# Patient Record
Sex: MALE | Race: OTHER | Employment: OTHER | URBAN - METROPOLITAN AREA
[De-identification: names, ages, dates, MRNs, and addresses within clinical notes are randomized per-mention and may not be internally consistent; named-entity substitution may affect disease eponyms.]

---

## 2022-02-01 NOTE — PATIENT DISCUSSION
Patient educated to discontinue contact lens wear a minimum of 4 days prior to repeat SOs. If patient reinserts after SOs, will need to DC 1 day prior to surgery.

## 2022-02-15 NOTE — PATIENT DISCUSSION
Cataract surgery has been performed in the first eye and activities of daily living are still impaired. The patient would like to proceed with cataract surgery in the second eye as scheduled. The patient elects B+ OD, goal of emmetropia.

## 2022-02-21 NOTE — PATIENT DISCUSSION
.  Chief Complaint   Patient presents with   • Trauma Red     Transfer from [facility].  Patient found with AMS, facial trauma loss of tooth, lip laceration 3 cm with fix at prior facility. +ETOH, aspiration of blood leading to aspiration pneumonia.  Intubated PTA to protect airway with propofol infusing.  NG/OG/Herring placed PTA.     Patient has a history of monovision with CL, did not appreciate.

## 2022-08-12 NOTE — PATIENT DISCUSSION
8/12/22: VF today more unreliable. Pt still complaining of loss of peripheral vision OS on his lower left side.

## 2023-04-19 ENCOUNTER — SURGERY/PROCEDURE (OUTPATIENT)
Dept: URBAN - METROPOLITAN AREA CLINIC 39 | Facility: CLINIC | Age: 78
End: 2023-04-19

## 2023-04-19 ENCOUNTER — PRE-OP/H&P (OUTPATIENT)
Dept: URBAN - METROPOLITAN AREA SURGERY 14 | Facility: SURGERY | Age: 78
End: 2023-04-19

## 2023-04-19 DIAGNOSIS — H25.813: ICD-10-CM

## 2023-04-19 PROCEDURE — 99211T TECH SERVICE

## 2023-04-19 PROCEDURE — 66999LNSR LENSAR LASER FOR CAT SX

## 2023-04-19 PROCEDURE — 66984CV REMOVE CATARACT, INSERT LENS, CUSTOM VISION

## 2023-04-20 ENCOUNTER — POST-OP (OUTPATIENT)
Dept: URBAN - METROPOLITAN AREA CLINIC 39 | Facility: CLINIC | Age: 78
End: 2023-04-20

## 2023-04-20 DIAGNOSIS — Z96.1: ICD-10-CM

## 2023-04-20 PROCEDURE — 99024 POSTOP FOLLOW-UP VISIT: CPT

## 2023-04-20 ASSESSMENT — TONOMETRY
OD_IOP_MMHG: 18
OS_IOP_MMHG: 16

## 2023-04-20 ASSESSMENT — VISUAL ACUITY
OS_SC: 20/40-1
OS_PH: 20/25+2
OD_SC: 20/30-2

## 2023-04-26 ENCOUNTER — SURGERY/PROCEDURE (OUTPATIENT)
Dept: URBAN - METROPOLITAN AREA CLINIC 39 | Facility: CLINIC | Age: 78
End: 2023-04-26

## 2023-04-26 ENCOUNTER — POST OP/EVAL OF SECOND EYE (OUTPATIENT)
Dept: URBAN - METROPOLITAN AREA CLINIC 39 | Facility: CLINIC | Age: 78
End: 2023-04-26

## 2023-04-26 DIAGNOSIS — Z96.1: ICD-10-CM

## 2023-04-26 DIAGNOSIS — H25.812: ICD-10-CM

## 2023-04-26 PROCEDURE — 92012 INTRM OPH EXAM EST PATIENT: CPT

## 2023-04-26 PROCEDURE — 66999LNSR LENSAR LASER FOR CAT SX

## 2023-04-26 PROCEDURE — 66984CV REMOVE CATARACT, INSERT LENS, CUSTOM VISION

## 2023-04-26 PROCEDURE — 65772LRI LRI DURING CAT SX

## 2023-04-26 ASSESSMENT — VISUAL ACUITY
OS_BAT: 20/70 W/ MR
OD_SC: 20/25
OS_SC: 20/30

## 2023-04-26 ASSESSMENT — TONOMETRY
OS_IOP_MMHG: 16
OD_IOP_MMHG: 16

## 2023-04-27 ENCOUNTER — POST-OP (OUTPATIENT)
Dept: URBAN - METROPOLITAN AREA CLINIC 39 | Facility: CLINIC | Age: 78
End: 2023-04-27

## 2023-04-27 DIAGNOSIS — Z96.1: ICD-10-CM

## 2023-04-27 PROCEDURE — 99024 POSTOP FOLLOW-UP VISIT: CPT

## 2023-04-27 ASSESSMENT — VISUAL ACUITY
OD_SC: 20/25-2
OS_SC: 20/30+2

## 2023-04-27 ASSESSMENT — TONOMETRY
OS_IOP_MMHG: 17
OD_IOP_MMHG: 16

## 2023-05-11 ENCOUNTER — POST-OP (OUTPATIENT)
Dept: URBAN - METROPOLITAN AREA CLINIC 39 | Facility: CLINIC | Age: 78
End: 2023-05-11

## 2023-05-11 DIAGNOSIS — Z96.1: ICD-10-CM

## 2023-05-11 PROCEDURE — 99024 POSTOP FOLLOW-UP VISIT: CPT

## 2023-05-11 ASSESSMENT — TONOMETRY
OD_IOP_MMHG: 18
OS_IOP_MMHG: 18

## 2023-05-11 ASSESSMENT — VISUAL ACUITY
OD_SC: 20/20-1
OS_SC: 20/20-2
OS_SC: J12-
OD_SC: J12

## 2024-05-06 ENCOUNTER — COMPREHENSIVE EXAM (OUTPATIENT)
Dept: URBAN - METROPOLITAN AREA CLINIC 39 | Facility: CLINIC | Age: 79
End: 2024-05-06

## 2024-05-06 DIAGNOSIS — H52.4: ICD-10-CM

## 2024-05-06 DIAGNOSIS — H43.811: ICD-10-CM

## 2024-05-06 PROCEDURE — 92015 DETERMINE REFRACTIVE STATE: CPT

## 2024-05-06 PROCEDURE — 92014 COMPRE OPH EXAM EST PT 1/>: CPT

## 2024-05-06 ASSESSMENT — TONOMETRY
OD_IOP_MMHG: 18
OS_IOP_MMHG: 18

## 2024-05-06 ASSESSMENT — VISUAL ACUITY
OD_SC: J10-
OS_SC: J10
OD_CC: J1-
OS_SC: 20/20-2
OD_SC: 20/20
OS_CC: J1

## 2025-06-02 ENCOUNTER — COMPREHENSIVE EXAM (OUTPATIENT)
Age: 80
End: 2025-06-02

## 2025-06-02 DIAGNOSIS — H43.811: ICD-10-CM

## 2025-06-02 DIAGNOSIS — H26.493: ICD-10-CM

## 2025-06-02 DIAGNOSIS — H52.4: ICD-10-CM

## 2025-06-02 PROCEDURE — 92015 DETERMINE REFRACTIVE STATE: CPT

## 2025-06-02 PROCEDURE — 92014 COMPRE OPH EXAM EST PT 1/>: CPT
